# Patient Record
Sex: FEMALE | Race: WHITE | HISPANIC OR LATINO | Employment: UNEMPLOYED | URBAN - METROPOLITAN AREA
[De-identification: names, ages, dates, MRNs, and addresses within clinical notes are randomized per-mention and may not be internally consistent; named-entity substitution may affect disease eponyms.]

---

## 2018-03-07 ENCOUNTER — APPOINTMENT (OUTPATIENT)
Dept: GENERAL RADIOLOGY | Facility: HOSPITAL | Age: 48
End: 2018-03-07

## 2018-03-07 ENCOUNTER — HOSPITAL ENCOUNTER (EMERGENCY)
Facility: HOSPITAL | Age: 48
Discharge: HOME | End: 2018-03-07
Attending: EMERGENCY MEDICINE | Admitting: EMERGENCY MEDICINE

## 2018-03-07 VITALS
HEIGHT: 63 IN | WEIGHT: 138 LBS | RESPIRATION RATE: 16 BRPM | OXYGEN SATURATION: 97 % | SYSTOLIC BLOOD PRESSURE: 131 MMHG | HEART RATE: 87 BPM | TEMPERATURE: 98.5 F | DIASTOLIC BLOOD PRESSURE: 83 MMHG | BODY MASS INDEX: 24.45 KG/M2

## 2018-03-07 DIAGNOSIS — S62.102A CLOSED FRACTURE OF LEFT WRIST, INITIAL ENCOUNTER: Primary | ICD-10-CM

## 2018-03-07 PROCEDURE — 99283 EMERGENCY DEPT VISIT LOW MDM: CPT

## 2018-03-07 PROCEDURE — 73110 X-RAY EXAM OF WRIST: CPT

## 2018-03-07 PROCEDURE — 73090 X-RAY EXAM OF FOREARM: CPT

## 2018-03-07 RX ORDER — NAPROXEN 250 MG/1
250 TABLET ORAL 2 TIMES DAILY PRN
Qty: 20 TABLET | Refills: 0 | Status: SHIPPED | OUTPATIENT
Start: 2018-03-07 | End: 2019-07-24
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
## 2018-03-08 NOTE — DISCHARGE INSTRUCTIONS
Cuidados del yeso o de la férula en los adultos  (Cast or Splint Care, Adult)  Los yesos y las férulas son soportes que se utilizan para proteger los huesos rotos y otras lesiones. Un yeso o bebe férula mantienen el hueso firme y en la posición correcta mientras se consolida. Los yesos y las férulas también ayudan a aliviar el dolor, la hinchazón y los calambres.  Un yeso es un soporte keith que suele estar hecho de fibra de tamra o plástico. Está hecho a la medida del cuerpo y ofrece más protección que bebe férula. No se puede quitar y volver a poner. Bebe férula es un soporte blando que suele estar hecho de lizet y elástico. Se puede ajustar y quitar según sea necesario.  Es posible que necesite un yeso o bebe férula si:  · Tiene un hueso roto.  · Tiene bebe lesión de las partes blandas.  · Debe evitar  bebe parte del cuerpo lesionada (dejarla inmóvil) después de bebe cirugía.  CÓMO CUIDAR UN YESO O BEBE FÉRULA  Si tiene un yeso:  · No introduzca nada adentro del yeso para rascarse la piel. Hacerlo aumentará el riesgo de provocar bebe infección.  · Controle todos los eri la piel de alrededor del yeso. Informe al médico acerca de cualquier inquietud.  · Puede aplicar bebe loción en la piel seca alrededor de los bordes del yeso. No aplique loción en la piel por debajo del yeso.  · Mantenga el yeso limpio.  · Si el yeso no es impermeable:  ¨ No deje que se moje.  ¨ Cúbralo con un envoltorio hermético cuando tome un baño de inmersión o bebe ducha.  Si tiene bebe férula:  · Úsela ny se lo haya indicado el médico. Quítesela solamente ny se lo haya indicado el médico.  · Afloje la férula si los dedos de las stanley o de los pies se le entumecen, siente hormigueos o se le enfrían y se tornan de color morgan.  · Mantenga la férula limpia.  · Si la férula no es impermeable:  ¨ No deje que se moje.  ¨ Cúbrala con un envoltorio hermético cuando tome un baño de inmersión o bebe ducha.  El baño  · No tome ema de inmersión ni nade  hasta que el médico lo autorice. Pregúntele al médico si puede ducharse. Rich vez solo le permitan arash ema de esponja.  · Si el yeso o la férula no son impermeables, cúbralos con un envoltorio hermético cuando tome un baño de inmersión o bebe ducha.  Control del dolor, de la rigidez y de la hinchazón  · Mueva los dedos de la mano o del pie con frecuencia para evitar la rigidez y reducir la hinchazón.  · Cuando esté sentado o acostado, eleve la angelica de la lesión por encima del nivel del corazón.  Seguridad  · No apoye el peso del cuerpo sobre la extremidad lesionada hasta que el médico lo autorice.  · Use muletas u otros dispositivos de ayuda ny se lo haya indicado el médico.  Instrucciones generales  · No ejerza presión en ninguna parte del yeso o de la férula hasta que se hayan endurecido. [place] puede tardar varias horas.  · [place]ome me[place]ann actividades normales ny se lo haya indicado el médico. Pregúntele al médico qué actividades son seguras para usted.  · [place] los medicamentos de venta sandie y los recetados solamente ny se lo haya indicado el médico.  · Concurra a todas las visitas de control ny se lo haya indicado el médico. [place] es importante.  SOLICITE ATENCIÓN MÉDICA SI:  · El yeso o la férula se dañan.  · La piel alrededor del yeso se enrojece o está en carne viva.  · La piel debajo del yeso le pica o le duele mucho.  · El yeso o la férula se sienten muy incómodos.  · Siente que el yeso o la férula están muy apretados o muy flojos.  · El yeso se moja o tiene bebe angelica blanda.  · Algún objeto se queda atascado bajo el yeso.  SOLICITE ATENCIÓN MÉDICA DE INMEDIATO SI:  · El dolor empeora.  · Siente hormigueo o entumecimiento en la angelica lesionada, o esta se le enfría o se torna de color morgan.  · La parte del cuerpo por encima o por debajo del yeso está hinchada o tiene manchas.  · No puede  ni sentir los dedos.  · Le sale bebe secreción por el yeso.  · Siente un dolor o presión intensos debajo del  yeso.  · Tiene dificultad para respirar.  · Le falta el aire.  · Siente dolor en el pecho.  Esta información no tiene ny fin reemplazar el consejo del médico. Asegúrese de hacerle al médico cualquier pregunta que tenga.  Document Released: 12/18/2006 Document Revised: 10/08/2014 Document Reviewed: 06/10/2017  [employer]vier Interactive Patient Education © 2017 [employer]
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
## 2024-05-29 ENCOUNTER — TELEPHONE (OUTPATIENT)
Dept: OBSTETRICS AND GYNECOLOGY | Age: 54
End: 2024-05-29
Payer: COMMERCIAL

## 2024-05-29 NOTE — TELEPHONE ENCOUNTER
Received referral from [facility] (Provider Kim Ellsworth).  Called patient to schedule appointment and  states she is not due for her annual exam until January or February.  I informed him that I could go ahead and schedule for January or February and he declined stating he wants to speak with the referring provider first.  Patient's  states he will call back to schedule after consulting with PCP.